# Patient Record
Sex: FEMALE | Race: WHITE | NOT HISPANIC OR LATINO | URBAN - METROPOLITAN AREA
[De-identification: names, ages, dates, MRNs, and addresses within clinical notes are randomized per-mention and may not be internally consistent; named-entity substitution may affect disease eponyms.]

---

## 2019-09-23 ENCOUNTER — LAB REQUISITION (OUTPATIENT)
Dept: LAB | Facility: HOSPITAL | Age: 44
End: 2019-09-23

## 2019-09-23 DIAGNOSIS — Z00.00 ROUTINE GENERAL MEDICAL EXAMINATION AT A HEALTH CARE FACILITY: ICD-10-CM

## 2019-09-23 LAB
FLUAV AG NPH QL: NEGATIVE
FLUBV AG NPH QL IA: NEGATIVE

## 2019-09-23 PROCEDURE — 87804 INFLUENZA ASSAY W/OPTIC: CPT | Performed by: NURSE PRACTITIONER

## 2021-03-19 ENCOUNTER — LAB (OUTPATIENT)
Dept: LAB | Facility: HOSPITAL | Age: 46
End: 2021-03-19

## 2021-03-19 DIAGNOSIS — Z00.00 ROUTINE GENERAL MEDICAL EXAMINATION AT A HEALTH CARE FACILITY: Primary | ICD-10-CM

## 2021-03-19 PROCEDURE — 36415 COLL VENOUS BLD VENIPUNCTURE: CPT

## 2021-03-19 PROCEDURE — 86762 RUBELLA ANTIBODY: CPT

## 2021-03-19 PROCEDURE — 86787 VARICELLA-ZOSTER ANTIBODY: CPT

## 2021-03-19 PROCEDURE — 86735 MUMPS ANTIBODY: CPT

## 2021-03-19 PROCEDURE — 86765 RUBEOLA ANTIBODY: CPT

## 2021-03-22 LAB
MEV IGG SER IA-ACNC: NORMAL
MUV IGG SER IA-ACNC: NORMAL
RUBV IGG SERPL IA-ACNC: NORMAL
VZV IGG SER IA-ACNC: NORMAL

## 2021-05-12 ENCOUNTER — E-VISIT (OUTPATIENT)
Dept: FAMILY MEDICINE CLINIC | Facility: TELEHEALTH | Age: 46
End: 2021-05-12

## 2021-05-12 DIAGNOSIS — Z76.89 RETURN TO WORK EVALUATION: Primary | ICD-10-CM

## 2021-05-12 PROCEDURE — BHEMPVIDEOVISIT: Performed by: NURSE PRACTITIONER

## 2021-05-12 NOTE — PROGRESS NOTES
Mary Hutchins    1975  [id number]    I have reviewed the e-Visit questionnaire and patient's answers, my assessment and plan are as follows:      HPI  Mary Hutchins is a 46 y.o. with need for return to work. She has no symptoms at this time, negative COVID-19 test and has completed her COVIDk-19 immunizations.     Review of Systems - ENT, respiratory negative for any symptoms.       Diagnoses and all orders for this visit:    1. Return to work evaluation (Primary)    Discussed denial to return to work with patient over the phone. She verbalizes understanding.     Negative COVID-19 test, COVID-19 pfizer vaccine 1/6/2021 and 1/29/2021, negative for symptoms.   Viral illness with no alternative diagnosis     Any medications prescribed have been sent electronically to   [id number] - [place], [place] - [address] [place] [address] AT [facility] [address] - [phone]  - [phone] [address] [address]  Phone: [phone] Fax: [phone]      Time Documentation  Counseled patient  Counseling topics: diagnosis, treatment options, follow up plan and return instructions  Total encounter time: counseling time more than 50% of visit: 17        CAROL Murphy  05/12/21  11:07 EDT

## 2021-05-12 NOTE — PATIENT INSTRUCTIONS
Since you have had a viral illness with no alternative diagnosis despite no symptoms, negative COVID-19 test and immunizations, you will not be able to return to work until you have completed the 10 day quarantine as per [facility] COVID-19 return to work policy as we discussed on the phone.

## 2022-07-27 ENCOUNTER — TELEMEDICINE (OUTPATIENT)
Dept: FAMILY MEDICINE CLINIC | Facility: TELEHEALTH | Age: 47
End: 2022-07-27

## 2022-07-27 VITALS — WEIGHT: 205 LBS | BODY MASS INDEX: 34.16 KG/M2 | HEIGHT: 65 IN

## 2022-07-27 DIAGNOSIS — Z11.52 ENCOUNTER FOR SCREENING FOR COVID-19: Primary | ICD-10-CM

## 2022-07-27 PROBLEM — R79.89 ABNORMAL CBC: Status: ACTIVE | Noted: 2021-04-01

## 2022-07-27 PROBLEM — N92.0 HEAVY MENSES: Status: ACTIVE | Noted: 2019-12-03

## 2022-07-27 PROBLEM — K90.0 CELIAC DISEASE: Status: ACTIVE | Noted: 2018-08-02

## 2022-07-27 PROCEDURE — BHEMPVIDEOVISIT: Performed by: NURSE PRACTITIONER

## 2022-07-27 RX ORDER — METOPROLOL SUCCINATE 25 MG/1
25 TABLET, EXTENDED RELEASE ORAL DAILY
COMMUNITY
Start: 2022-07-25

## 2022-07-27 RX ORDER — RIMEGEPANT SULFATE 75 MG/75MG
1 TABLET, ORALLY DISINTEGRATING ORAL EVERY OTHER DAY
COMMUNITY
Start: 2022-06-21

## 2022-07-27 NOTE — PATIENT INSTRUCTIONS
Patient/employee reports that she has done the employee screening. Advised to EH guidelines. Additionally advising that if she has any healthcare needs we are here 24 hours a day.

## 2022-07-27 NOTE — PROGRESS NOTES
"You have chosen to receive care through a telehealth visit.  Do you consent to use a video/audio connection for your medical care today? Yes     CHIEF COMPLAINT  Cc: covid screening    HPI  Mary Hutchins is a 47 y.o. female  presents requesting a COVID screening. She is a [employer] employee and has done the daily screening tool. She has been directed to [facility] for a COVID PCR screening test. She just got back from vacation out of town. She has no known exposure to COVID. She developed a cough 07/25/2022. She is vaccinated for COVID via two doses and a booster of the Pfizer vaccine.    Review of Systems   Constitutional: Negative for fatigue and fever.   HENT: Positive for congestion (mild). Negative for postnasal drip, rhinorrhea, sinus pressure, sinus pain, sneezing, sore throat and tinnitus.    Respiratory: Positive for cough. Negative for chest tightness, shortness of breath and wheezing.    Cardiovascular: Negative for chest pain.   Gastrointestinal: Negative for diarrhea, nausea and vomiting.   Musculoskeletal: Negative for myalgias.   Neurological: Negative for headaches.       Past Medical History:   Diagnosis Date   • Migraine        No family history on file.    Social History     Socioeconomic History   • Marital status:    Tobacco Use   • Smoking status: Never Smoker   • Smokeless tobacco: Never Used       Mary Hutchins  reports that she has never smoked. She has never used smokeless tobacco..     Ht 165.1 cm (65\")   Wt 93 kg (205 lb)   Breastfeeding No   BMI 34.11 kg/m²     PHYSICAL EXAM  Physical Exam   Constitutional: She is oriented to person, place, and time. She appears well-developed and well-nourished.   HENT:   Head: Normocephalic and atraumatic.   Right Ear: External ear normal.   Left Ear: External ear normal.   Nose: Nose normal.   Mouth/Throat: Mouth/Lips are normal.  Eyes: Lids are normal. Right eye exhibits no discharge and no exudate. Left eye exhibits no discharge and no " exudate. Right conjunctiva is not injected. Left conjunctiva is not injected.   Pulmonary/Chest: No accessory muscle usage. No tachypnea and no bradypnea.  No respiratory distress.No use of oxygen by nasal cannulaNo use of oxygen by mask noted.  Abdominal: There is no abdominal tenderness.   Neurological: She is alert and oriented to person, place, and time. No cranial nerve deficit.   Skin: Her skin appears normal.  Psychiatric: She has a normal mood and affect. Her speech is normal and behavior is normal. Judgment and thought content normal.       Results for orders placed or performed during the hospital encounter of 05/10/21   COVID-19,[facility] ROUTINE, NP/OP SWAB IN TRANSPORT MEDIA OR ESWAB 72 HR TAT - Swab, Anterior nasal    Specimen: Anterior nasal; Swab   Result Value Ref Range    SARS-CoV-2, ELIZABETH Not Detected Not Detected   SARS-CoV-2, ELIZABETH 2 DAY TAT - Swab, Anterior nasal    Specimen: Anterior nasal; Swab   Result Value Ref Range    [facility] SARS-COV-2, ELIZABETH 2 DAY TAT Performed        Diagnoses and all orders for this visit:    1. Encounter for screening for COVID-19 (Primary)  -     COVID-19,[facility] ROUTINE, NP/OP SWAB IN TRANSPORT MEDIA OR ESWAB 72 HR TAT - Swab, Nasopharynx; Future    COVID test and results pending,. Results will come back in your chart. We will call positive to you.    FOLLOW-UP  As instructed by employee health for return to work    If symptoms worsen or persist follow up with PCP.Virtual Care or Urgent Care      Patient verbalizes understanding of medication dosage, comfort measures, instructions for treatment and follow-up.    Gwen Cox, CAROL  07/27/2022  07:24 EDT    The use of a video visit has been reviewed with the patient and verbal informed consent has been obtained. Myself and Mary Hutchins participated in this visit. The patient is located in [address] [place] [address].    I am located in [place], [place]. Mychart and Zoom were utilized. I spent 20 minutes in the  patient's chart for this visit.

## 2023-12-06 ENCOUNTER — APPOINTMENT (OUTPATIENT)
Dept: CT IMAGING | Facility: HOSPITAL | Age: 48
End: 2023-12-06
Payer: COMMERCIAL

## 2023-12-06 ENCOUNTER — HOSPITAL ENCOUNTER (EMERGENCY)
Facility: HOSPITAL | Age: 48
Discharge: HOME | End: 2023-12-06
Attending: EMERGENCY MEDICINE
Payer: COMMERCIAL

## 2023-12-06 VITALS
RESPIRATION RATE: 18 BRPM | SYSTOLIC BLOOD PRESSURE: 127 MMHG | HEIGHT: 65 IN | BODY MASS INDEX: 39.99 KG/M2 | WEIGHT: 240 LBS | OXYGEN SATURATION: 97 % | DIASTOLIC BLOOD PRESSURE: 63 MMHG | HEART RATE: 68 BPM | TEMPERATURE: 97.9 F

## 2023-12-06 DIAGNOSIS — R06.02 SHORTNESS OF BREATH: Primary | ICD-10-CM

## 2023-12-06 DIAGNOSIS — R06.00 DYSPNEA, UNSPECIFIED TYPE: ICD-10-CM

## 2023-12-06 LAB
ALBUMIN SERPL-MCNC: 3.9 G/DL (ref 3.5–5.2)
ALBUMIN/GLOB SERPL: 1.3 G/DL
ALP SERPL-CCNC: 94 U/L (ref 39–117)
ALT SERPL W P-5'-P-CCNC: 28 U/L (ref 1–33)
ANION GAP SERPL CALCULATED.3IONS-SCNC: 8.9 MMOL/L (ref 5–15)
AST SERPL-CCNC: 17 U/L (ref 1–32)
BASOPHILS # BLD AUTO: 0.04 10*3/MM3 (ref 0–0.2)
BASOPHILS NFR BLD AUTO: 0.5 % (ref 0–1.5)
BILIRUB SERPL-MCNC: 0.4 MG/DL (ref 0–1.2)
BUN SERPL-MCNC: 17 MG/DL (ref 6–20)
BUN/CREAT SERPL: 31.5 (ref 7–25)
CALCIUM SPEC-SCNC: 8.7 MG/DL (ref 8.6–10.5)
CHLORIDE SERPL-SCNC: 106 MMOL/L (ref 98–107)
CO2 SERPL-SCNC: 23.1 MMOL/L (ref 22–29)
CREAT SERPL-MCNC: 0.54 MG/DL (ref 0.57–1)
DEPRECATED RDW RBC AUTO: 39 FL (ref 37–54)
EGFRCR SERPLBLD CKD-EPI 2021: 115.2 ML/MIN/1.73
EOSINOPHIL # BLD AUTO: 0.12 10*3/MM3 (ref 0–0.4)
EOSINOPHIL NFR BLD AUTO: 1.6 % (ref 0.3–6.2)
ERYTHROCYTE [DISTWIDTH] IN BLOOD BY AUTOMATED COUNT: 11.9 % (ref 12.3–15.4)
FLUAV SUBTYP SPEC NAA+PROBE: NOT DETECTED
FLUBV RNA ISLT QL NAA+PROBE: NOT DETECTED
GLOBULIN UR ELPH-MCNC: 2.9 GM/DL
GLUCOSE SERPL-MCNC: 95 MG/DL (ref 65–99)
HCT VFR BLD AUTO: 38.2 % (ref 34–46.6)
HGB BLD-MCNC: 12.7 G/DL (ref 12–15.9)
IMM GRANULOCYTES # BLD AUTO: 0.02 10*3/MM3 (ref 0–0.05)
IMM GRANULOCYTES NFR BLD AUTO: 0.3 % (ref 0–0.5)
LYMPHOCYTES # BLD AUTO: 1.88 10*3/MM3 (ref 0.7–3.1)
LYMPHOCYTES NFR BLD AUTO: 25.2 % (ref 19.6–45.3)
MCH RBC QN AUTO: 29.1 PG (ref 26.6–33)
MCHC RBC AUTO-ENTMCNC: 33.2 G/DL (ref 31.5–35.7)
MCV RBC AUTO: 87.6 FL (ref 79–97)
MONOCYTES # BLD AUTO: 0.62 10*3/MM3 (ref 0.1–0.9)
MONOCYTES NFR BLD AUTO: 8.3 % (ref 5–12)
NEUTROPHILS NFR BLD AUTO: 4.78 10*3/MM3 (ref 1.7–7)
NEUTROPHILS NFR BLD AUTO: 64.1 % (ref 42.7–76)
PLATELET # BLD AUTO: 254 10*3/MM3 (ref 140–450)
PMV BLD AUTO: 10.4 FL (ref 6–12)
POTASSIUM SERPL-SCNC: 4 MMOL/L (ref 3.5–5.2)
PROT SERPL-MCNC: 6.8 G/DL (ref 6–8.5)
QT INTERVAL: 406 MS
QTC INTERVAL: 432 MS
RBC # BLD AUTO: 4.36 10*6/MM3 (ref 3.77–5.28)
SARS-COV-2 RNA RESP QL NAA+PROBE: NOT DETECTED
SODIUM SERPL-SCNC: 138 MMOL/L (ref 136–145)
TROPONIN T SERPL HS-MCNC: <6 NG/L
WBC NRBC COR # BLD AUTO: 7.46 10*3/MM3 (ref 3.4–10.8)

## 2023-12-06 PROCEDURE — 84484 ASSAY OF TROPONIN QUANT: CPT | Performed by: EMERGENCY MEDICINE

## 2023-12-06 PROCEDURE — 25810000003 SODIUM CHLORIDE 0.9 % SOLUTION: Performed by: EMERGENCY MEDICINE

## 2023-12-06 PROCEDURE — 87636 SARSCOV2 & INF A&B AMP PRB: CPT | Performed by: EMERGENCY MEDICINE

## 2023-12-06 PROCEDURE — 25510000001 IOPAMIDOL PER 1 ML: Performed by: EMERGENCY MEDICINE

## 2023-12-06 PROCEDURE — 93005 ELECTROCARDIOGRAM TRACING: CPT | Performed by: EMERGENCY MEDICINE

## 2023-12-06 PROCEDURE — 99285 EMERGENCY DEPT VISIT HI MDM: CPT

## 2023-12-06 PROCEDURE — 80053 COMPREHEN METABOLIC PANEL: CPT | Performed by: EMERGENCY MEDICINE

## 2023-12-06 PROCEDURE — 96360 HYDRATION IV INFUSION INIT: CPT

## 2023-12-06 PROCEDURE — 85025 COMPLETE CBC W/AUTO DIFF WBC: CPT | Performed by: EMERGENCY MEDICINE

## 2023-12-06 PROCEDURE — 71275 CT ANGIOGRAPHY CHEST: CPT

## 2023-12-06 PROCEDURE — 36415 COLL VENOUS BLD VENIPUNCTURE: CPT

## 2023-12-06 PROCEDURE — 99284 EMERGENCY DEPT VISIT MOD MDM: CPT | Performed by: EMERGENCY MEDICINE

## 2023-12-06 RX ORDER — IPRATROPIUM BROMIDE AND ALBUTEROL SULFATE 2.5; .5 MG/3ML; MG/3ML
3 SOLUTION RESPIRATORY (INHALATION) ONCE
Status: COMPLETED | OUTPATIENT
Start: 2023-12-06 | End: 2023-12-06

## 2023-12-06 RX ADMIN — IOPAMIDOL 100 ML: 755 INJECTION, SOLUTION INTRAVENOUS at 09:57

## 2023-12-06 RX ADMIN — SODIUM CHLORIDE 1000 ML: 9 INJECTION, SOLUTION INTRAVENOUS at 09:05

## 2023-12-06 RX ADMIN — IPRATROPIUM BROMIDE AND ALBUTEROL SULFATE 3 ML: .5; 3 SOLUTION RESPIRATORY (INHALATION) at 10:29

## 2023-12-06 NOTE — DISCHARGE INSTRUCTIONS
We discussed all the findings including labs and a CTA chest without any concerning findings including pneumonia and pulmonary embolism also known as blood clots which could be a cause of your shortness of breath.  We then trialed a treatment of a DuoNeb or breathing treatment to see if that would make a difference.  You felt it did not make a difference so we will not waste your money on prescribing this for you.  This sensation could very well  be related to mild inflammation post-COVID.  You might consider taking ibuprofen 400 mg to 600 mg every 6 hours with food or fluid to see if this would make a difference on any kind of remaining inflammation.  This may be genetic related or it may be something that can be treated with quercetin so he might look up some of these supplements for post-COVID symptoms.  If anything changes that you get more short of breath or have chest pain back pain or fever please return to the ER.  Otherwise follow-up with your primary care doctor within a week.

## 2023-12-06 NOTE — FSED PROVIDER NOTE
Subjective   History of Present Illness  The patient is a 48-year-old  female presents emergency room with complaints of acute shortness of breath.  Patient states that this morning, she had acute onset of shortness of breath without chest pain.  She reports that a little over 3 weeks ago, she was diagnosed with COVID-19 infection.  Patient states that she had infectious type symptoms including cough and congestion with that which seem to have resolved.  Patient states that since having COVID-19, she has had episodes where going up stairs makes her feel little bit more winded.  Patient states that she has never had anything like she has experienced this morning.  She still feels persistently short of breath.  She is here for evaluation.        Review of Systems   Constitutional: Negative.  Negative for chills, fatigue and fever.   Eyes: Negative.    Respiratory:  Positive for shortness of breath. Negative for cough and chest tightness.    Cardiovascular:  Negative for chest pain and palpitations.   Gastrointestinal:  Negative for abdominal pain, diarrhea, nausea and vomiting.   Genitourinary: Negative.    Musculoskeletal: Negative.    Skin: Negative.  Negative for rash.   Neurological: Negative.  Negative for syncope, weakness, numbness and headaches.   Psychiatric/Behavioral: Negative.     All other systems reviewed and are negative.      Past Medical History:   Diagnosis Date    Migraine        Allergies   Allergen Reactions    Hydrocodone-Acetaminophen Nausea And Vomiting       Past Surgical History:   Procedure Laterality Date     SECTION      x 5       History reviewed. No pertinent family history.    Social History     Socioeconomic History    Marital status:    Tobacco Use    Smoking status: Never    Smokeless tobacco: Never           Objective   Physical Exam  Vitals and nursing note reviewed.   Constitutional:       General: She is not in acute distress.     Appearance: Normal  appearance. She is normal weight.   HENT:      Right Ear: External ear normal.      Left Ear: External ear normal.      Nose: Nose normal.   Cardiovascular:      Rate and Rhythm: Normal rate.   Pulmonary:      Effort: Pulmonary effort is normal.   Musculoskeletal:         General: Normal range of motion.      Cervical back: Normal range of motion.   Skin:     Capillary Refill: Capillary refill takes less than 2 seconds.   Neurological:      General: No focal deficit present.      Mental Status: She is alert and oriented to person, place, and time.   Psychiatric:         Mood and Affect: Mood normal.         Procedures           ED Course  ED Course as of 12/06/23 1057   Wed Dec 06, 2023   0828 EKG shows normal sinus rhythm.  The rate is 68 bpm.  There is no acute ST segment or T wave changes noted.  There is no ectopy.  Intervals are normal. [KZ]   0831 Care transferred to Dr. Mccarty, pending workup. [KZ]   0858 Lab work pending and CT pending for PE.    Examined patient, moving air well with breathing, clear, no abnormal sounds, O2 sats 97%, still feels like she has to think to breath. D/w her what the work up was for, ie PE and pneumonia, lab work and CT, she understood and agreed [AR]   0903 COVID and influenza negative; EKG  NSR rate 68  low voltage (pt is obese), no stemi, no right heart strain, no S1Q3T3 pattern [AR]   0927 CVC wnl,  [AR]   0951 Creatinine is actually slightly low at 0.54.  Troponin is less than 6 the rest of the CMP is normal however the BUN/creatinine is 31.5 due to the creatinine being that low.  CT is pending a D-dimer was not done originally since the highest concern and throughout was a PE and a CTA chest was can be done anyway [AR]   1021 CTA results   No thrombus is seen in the main,  segmental, or visualized subsegmental pulmonary arteries. No evidence of  right heart strain.     Dependent atelectasis is seen in the lungs, without consolidation,  pleural effusion, significant size  nodule or mass, or pneumothorax. The  central airways are patent.     The heart is normal in size and configuration, without pericardial  effusion. The mediastinal vasculature is normal in caliber. No enlarged  supraclavicular, axillary, mediastinal, or hilar lymph nodes are  demonstrated.   [AR]   1021 D/w pt results, experiment with duoneb to see if this will help, she agreed to this and to take deep breaths during treatment [AR]      ED Course User Index  [AR] Deann Mccarty MD  [KZ] Ollie Howell MD                                           Medical Decision Making  Patient presents emergency room with shortness of breath.  She denies chest pain.  Differential diagnosis includes infectious etiology including bronchitis and pneumonia.  Patient's lung sounds clear.  Other etiologies may include pulmonary embolism given her previous COVID-19 infection.  ACS also considered.  Given this, a cardiac workup plus a CT scan of her chest has been ordered.  These results are pending.    The patient and I:    We discussed all the findings including labs and a CTA chest without any concerning findings including pneumonia and pulmonary embolism also known as blood clots which could be a cause of your shortness of breath.  We then trialed a treatment of a DuoNeb or breathing treatment to see if that would make a difference.  You felt it did not make a difference so we will not waste your money on prescribing this for you.  This sensation could very well could be related to mild inflammation post-COVID.  You consider taking ibuprofen 400 mg to 600 mg every 6 hours with food or fluid to see if this would make a difference on any kind of remaining inflammation.  This may be genetic related or it may be something that can be treated with quercetin so he might look up some of these supplements for post-COVID symptoms.  If anything changes that you get more short of breath or have chest pain back pain or fever please return to  the ER.  Otherwise follow-up with your primary care doctor within a week.    She understood and agreed    Problems Addressed:  Shortness of breath: complicated acute illness or injury    Amount and/or Complexity of Data Reviewed  Labs: ordered. Decision-making details documented in ED Course.  Radiology: ordered. Decision-making details documented in ED Course.  ECG/medicine tests: ordered. Decision-making details documented in ED Course.    Risk  Prescription drug management.        Final diagnoses:   Shortness of breath   Dyspnea, unspecified type       ED Disposition  ED Disposition       ED Disposition   Discharge    Condition   Stable    Comment   --               Melanie Haro MD  [address] [address]  [phone]    In 1 week           Medication List      No changes were made to your prescriptions during this visit.

## 2023-12-26 ENCOUNTER — APPOINTMENT (OUTPATIENT)
Dept: CT IMAGING | Facility: HOSPITAL | Age: 48
End: 2023-12-26
Payer: COMMERCIAL

## 2023-12-26 ENCOUNTER — HOSPITAL ENCOUNTER (EMERGENCY)
Facility: HOSPITAL | Age: 48
Discharge: HOME | End: 2023-12-27
Attending: EMERGENCY MEDICINE
Payer: COMMERCIAL

## 2023-12-26 VITALS
WEIGHT: 240 LBS | RESPIRATION RATE: 16 BRPM | SYSTOLIC BLOOD PRESSURE: 153 MMHG | HEIGHT: 65 IN | HEART RATE: 79 BPM | OXYGEN SATURATION: 100 % | TEMPERATURE: 97.9 F | BODY MASS INDEX: 39.99 KG/M2 | DIASTOLIC BLOOD PRESSURE: 99 MMHG

## 2023-12-26 DIAGNOSIS — G51.0 BELL'S PALSY: Primary | ICD-10-CM

## 2023-12-26 PROCEDURE — 99284 EMERGENCY DEPT VISIT MOD MDM: CPT

## 2023-12-26 PROCEDURE — 70450 CT HEAD/BRAIN W/O DYE: CPT

## 2023-12-26 RX ORDER — PREDNISONE 20 MG/1
TABLET ORAL
Qty: 12 TABLET | Refills: 0 | Status: SHIPPED | OUTPATIENT
Start: 2023-12-26

## 2023-12-26 RX ORDER — ACYCLOVIR 400 MG/1
400 TABLET ORAL
Qty: 35 TABLET | Refills: 0 | Status: SHIPPED | OUTPATIENT
Start: 2023-12-26 | End: 2024-01-02

## 2023-12-27 PROCEDURE — 63710000001 PREDNISONE PER 5 MG: Performed by: EMERGENCY MEDICINE

## 2023-12-27 PROCEDURE — 99283 EMERGENCY DEPT VISIT LOW MDM: CPT | Performed by: EMERGENCY MEDICINE

## 2023-12-27 RX ADMIN — PREDNISONE 10 MG: 10 TABLET ORAL at 00:04

## 2023-12-27 NOTE — FSED PROVIDER NOTE
Subjective   History of Present Illness  Patient is a 48-year-old female.  She presents with development of inability close her right eye, some facial droop of the right mouth as well.  No focal motor or sensory deficits in her extremities.  No fever no chills.  No personal history of Bell's palsy.  There is been no trauma      Review of Systems  Constitutional: No fevers, chills, sweats unless otherwise documented in HPI  Eyes: No recent visual problems, eye discharge, eye pain, redness unless otherwise documented in HPI  HEENT: No ear pain, nasal congestion, sore throat, voice changes unless otherwise documented in HPI  Respiratory: No shortness of breath, cough, pain on breathing, sputum production unless otherwise documented in HPI  Cardiovascular: No chest pain, palpitations, syncope, orthopnea unless otherwise documented in HPI  Gastrointestinal: No nausea, vomiting, diarrhea, constipation unless otherwise documented in HPI  Genitourinary: No hematuria, dysuria, incontinence unless otherwise documented in HPI  Endocrine: Negative for excessive thirst, excessive hunger, excessive urination, heat or cold intolerance unless otherwise documented in HPI  Musculoskeletal: No back pain, neck pain, joint pain, muscle pain, decreased range of motion unless otherwise documented in HPI  Integumentary: No rash, pruritus, abrasion, lesions unless otherwise documented in HPI  Neurologic: No weakness, numbness, frequent headaches, tremors unless otherwise documented in HPI  Psychiatric: No anxiety, depression, mood changes, hallucinations unless otherwise documented in HPI        Past Medical History:   Diagnosis Date    Migraine        Allergies   Allergen Reactions    Hydrocodone-Acetaminophen Nausea And Vomiting       Past Surgical History:   Procedure Laterality Date     SECTION      x 5       History reviewed. No pertinent family history.    Social History     Socioeconomic History    Marital status:     Tobacco Use    Smoking status: Never    Smokeless tobacco: Never           Objective   Physical Exam  Vital signs: Reviewed in nurses notes    General: Awake alert no acute distress    HEENT: Normocephalic atraumatic pupils are equal round Sponsler light.  Nasopharynx is clear oropharynx is clear and moist    Neck:   Supple without lymphadenopathy    Respiratory:   Nonlabored respirations.  Clear to auscultation bilaterally.  Equal breath sounds bilaterally.  No wheezes or stridor noted.    Cardiovascular: Regular rate and rhythm.  No murmur.  Equal pulses in bilateral lower extremities without edema.    Abdomen: Nondistended    Skin:   Warm and dry    Neurological examination: Patient is awake alert oriented x 4.  Speech is intact.  There is some right-sided facial palsy.  This does involve the eyelid as well as the mouth.  This likewise does involve the right forehead with inability to wrinkle the forehead.    Procedures           ED Course      CT Head Without Contrast    Result Date: 12/26/2023  CT OF THE HEAD WITHOUT CONTRAST  HISTORY: Right-sided facial palsy  COMPARISON: None available.  TECHNIQUE: Axial CT imaging was obtained through the brain. No IV contrast was administered.  FINDINGS: No acute intracranial hemorrhage is seen. Ventricles are normal in size. There is no midline shift or mass effect. Paranasal sinuses and mastoid air cells appear clear.      No acute intracranial findings.  Radiation dose reduction techniques were utilized, including automated exposure control and exposure modulation based on body size.   This report was finalized on 12/26/2023 11:23 PM by Dr. Adia Morgan M.D on Workstation: [id number]                                   Medications   predniSONE (DELTASONE) tablet 60 mg (10 mg Oral Given 12/27/23 0004)            Ddx: [place] Palsy, CVA  Medical Decision Making  Problems Addressed:  Bell's palsy: complicated acute illness or injury    Amount and/or Complexity of Data  Reviewed  Radiology: ordered.    Risk  Prescription drug management.    Upon discharge patient noted to be very stable.  Appropriate treatment plan and return precautions discussed    Final diagnoses:   Bell's palsy       ED Disposition  ED Disposition       ED Disposition   Discharge    Condition   Stable    Comment   --               Melanie Haro MD  [address]  [place] [id number] [phone]    In 1 week           Medication List        New Prescriptions      acyclovir 400 MG tablet  Commonly known as: ZOVIRAX  Take 1 tablet by mouth 5 (Five) Times a Day for 7 days. Take no more than 5 doses a day.     predniSONE 20 MG tablet  Commonly known as: DELTASONE  3 po daily x 2d, then 2 po daily x 2d, then 1 po daily x 2d.               Where to Get Your Medications        These medications were sent to [id number] - [place], [place] - [address] AT [address] - [phone]  - [phone]   [address] [facility] [address]      Phone: [phone]   acyclovir 400 MG tablet  predniSONE 20 MG tablet

## 2023-12-27 NOTE — DISCHARGE INSTRUCTIONS
Today your exam is consistent with Bell's palsy.    We are going to place you on a course of steroids and acyclovir, and antiviral.    Is important to utilize an eye patch, primarily at nighttime.  This is to prevent your eye from opening and dry in your cornea.    I would also like you to  some artificial tears over-the-counter at your pharmacy to maintain hydration of your eye.    Return to ER for worsening signs or symptoms.    Please read all of the instructions in this handout.  If you receive prescriptions please fill them and take them as directed.  Please call your primary care physician for follow-up appointment in the next 5 to 7 days.  If you do not have a physician you may call the Patient [facility] referral line at [phone].    You may return to the emergency department at any time for any concerns such as worsening symptoms.  If you received a work or school note it will be printed at the back of this packet.

## 2024-04-29 PROBLEM — I87.2 VENOUS INSUFFICIENCY: Status: ACTIVE | Noted: 2024-04-29

## 2024-04-29 PROBLEM — I83.819 VARICOSE VEINS WITH PAIN: Status: ACTIVE | Noted: 2024-04-29

## 2024-05-17 NOTE — PROGRESS NOTES
"Chief Complaint  Follow-up (1 yr follow up for varicose veins with pain. )    Subjective        Mary Hutchins presents to [facility]  History of Present Illness the patient is a 49-year-old female initially seen on 2023 for venous insufficiency, varicose veins and leg swelling.  Venous scan with reflux and mapping on May 23, 2023 demonstrated deep venous insufficiency bilaterally with no superficial insufficiency on the right and minimal reflux on the left at the saphenofemoral junction and in the proximal greater saphenous vein.  The greater saphenous vein was very small at mid thigh distally less than 2 mm in diameter.  Since her problem was primarily swelling it was elected to treat her with compression stockings of 20 to 30 mmHg.  She returns for reevaluation.  She continues to be controlled nicely with compression stockings.    Past History:  Medical History: has a past medical history of Bilateral leg pain (2023), Localized edema, Migraine, Spider veins (2023), Varicose veins with pain, and Venous insufficiency (chronic) (peripheral) (2023).   Surgical History: has a past surgical history that includes  section and Gallbladder surgery.   Family History: family history includes Other in her father; Stroke in her father.   Social History: reports that she has never smoked. She has never used smokeless tobacco.    (Not in a hospital admission)     Allergies: Hydrocodone-acetaminophen   Objective   Vital Signs:  /80   Ht 165.1 cm (65\")   Wt 109 kg (240 lb)   BMI 39.94 kg/m²   Estimated body mass index is 39.94 kg/m² as calculated from the following:    Height as of this encounter: 165.1 cm (65\").    Weight as of this encounter: 109 kg (240 lb).         Mary Hutchins  reports that she has never smoked. She has never used smokeless tobacco.       Physical Exam few minor spider telangiectasia on the right and 1 reticular varix in " the upper calf on the left.  No edema noted.  Very early grade 2 venous stasis changes bilaterally.  Palpable pedal pulses noted.    Result Review :    The following data was reviewed by: Torey Francois Jr., MD on 05/21/2024:    Data reviewed : Venous scan with reflux and mapping from May 23, 2023, findings described above             Assessment and Plan     Diagnoses and all orders for this visit:    1. Varicose veins with pain (Primary)    2. Venous insufficiency    Her swelling appears to be managed satisfactorily with compression stockings.  She has had no worsening of her symptoms nor any enlargement of any varices.  At this point we will continue the same.  I will see her in follow-up in 1 year or sooner if new problems arise.         Follow Up     Return in about 1 year (around 5/21/2025).  Patient was given instructions and counseling regarding her condition or for health maintenance advice. Please see specific information pulled into the AVS if appropriate.

## 2024-05-21 ENCOUNTER — OFFICE VISIT (OUTPATIENT)
Age: 49
End: 2024-05-21
Payer: COMMERCIAL

## 2024-05-21 VITALS
HEIGHT: 65 IN | BODY MASS INDEX: 39.99 KG/M2 | DIASTOLIC BLOOD PRESSURE: 80 MMHG | SYSTOLIC BLOOD PRESSURE: 120 MMHG | WEIGHT: 240 LBS

## 2024-05-21 DIAGNOSIS — I87.2 VENOUS INSUFFICIENCY: ICD-10-CM

## 2024-05-21 DIAGNOSIS — I83.819 VARICOSE VEINS WITH PAIN: Primary | ICD-10-CM

## 2024-05-21 RX ORDER — PIMECROLIMUS 10 MG/G
CREAM TOPICAL
COMMUNITY
Start: 2024-03-01

## 2024-05-21 RX ORDER — DICYCLOMINE HYDROCHLORIDE 10 MG/1
10 CAPSULE ORAL 4 TIMES DAILY
COMMUNITY
Start: 2024-02-09

## 2024-05-21 RX ORDER — METFORMIN HYDROCHLORIDE 500 MG/1
500 TABLET, EXTENDED RELEASE ORAL
COMMUNITY
Start: 2024-05-09

## 2024-05-21 RX ORDER — ONDANSETRON 4 MG/1
4 TABLET, ORALLY DISINTEGRATING ORAL
COMMUNITY
Start: 2024-02-29

## 2025-05-19 NOTE — PROGRESS NOTES
"Chief Complaint  No chief complaint on file.  Follow-up on venous insufficiency and varicose veins    Subjective        Mary Hutchins presents to [facility] VASCULAR SURGERY  History of Present Illness  the patient is a 50-year-old female initially seen on 2023 for venous insufficiency, varicose veins and leg swelling.  Venous scan with reflux and mapping on May 23, 2023 demonstrated deep venous insufficiency bilaterally with no superficial insufficiency on the right and minimal reflux on the left at the saphenofemoral junction and in the proximal greater saphenous vein.  The greater saphenous vein was very small at mid thigh distally less than 2 mm in diameter.  Since her problem was primarily swelling it was elected to treat her with compression stockings of 20 to 30 mmHg.  She returns for reevaluation.  She continues to be controlled nicely with compression stockings.  She returned on May 27, 2025 and follow-up.  Still has some discomfort related to her venous insufficiency       Past History:  Medical History: has a past medical history of Bilateral leg pain (2023), Localized edema, Migraine, Spider veins (2023), Varicose veins with pain, and Venous insufficiency (chronic) (peripheral) (2023).   Surgical History: has a past surgical history that includes  section and Gallbladder surgery.   Family History: family history includes Other in her father; Stroke in her father.   Social History: reports that she has never smoked. She has never used smokeless tobacco.    (Not in a hospital admission)     Allergies: Hydrocodone-acetaminophen   Objective   Vital Signs:  There were no vitals taken for this visit.  Estimated body mass index is 39.94 kg/m² as calculated from the following:    Height as of 24: 165.1 cm (65\").    Weight as of 24: 109 kg (240 lb).     Class 2 Severe Obesity (BMI >=35 and <=39.9). Obesity-related health conditions include the " following: Venous insufficiency/varicose veins. Obesity is improving with lifestyle modifications. BMI is is above average; BMI management plan is completed. We discussed portion control and increasing exercise.    Mary Hutchins  reports that she has never smoked. She has never used smokeless tobacco. I have educated her on the risk of diseases from using tobacco products such as cancer, COPD, and heart disease.     I advised her to quit and she is not willing to quit.                 Physical Exam only trace edema noted bilaterally.  Few scattered spider telangiectasia in the thigh and knee region.  Palpable pulses.  Result Review :                     Assessment and Plan     Diagnoses and all orders for this visit:    1. Varicose veins with pain (Primary)    2. Venous insufficiency    She is dealing with primarily deep venous insufficiency and there is no superficial insufficiency to speak of.  Her greater saphenous veins are very small and sclerotic and truthfully not treatable.,  Compression stockings are the mainstay of therapy.  Also discussed elevation and muscle pump exercises to keep her venous pressure as low as possible.  I will see her in follow-up in 1 year or sooner if new problems arise.         Follow Up     No follow-ups on file.  Patient was given instructions and counseling regarding her condition or for health maintenance advice. Please see specific information pulled into the AVS if appropriate.

## 2025-05-27 ENCOUNTER — OFFICE VISIT (OUTPATIENT)
Age: 50
End: 2025-05-27
Payer: COMMERCIAL

## 2025-05-27 VITALS
WEIGHT: 240 LBS | SYSTOLIC BLOOD PRESSURE: 128 MMHG | DIASTOLIC BLOOD PRESSURE: 80 MMHG | HEIGHT: 65 IN | BODY MASS INDEX: 39.99 KG/M2

## 2025-05-27 DIAGNOSIS — I87.2 VENOUS INSUFFICIENCY: ICD-10-CM

## 2025-05-27 DIAGNOSIS — I83.819 VARICOSE VEINS WITH PAIN: Primary | ICD-10-CM

## 2025-05-27 RX ORDER — TIRZEPATIDE 10 MG/.5ML
INJECTION, SOLUTION SUBCUTANEOUS
COMMUNITY
Start: 2025-04-29